# Patient Record
Sex: FEMALE | Race: WHITE | Employment: STUDENT | URBAN - METROPOLITAN AREA
[De-identification: names, ages, dates, MRNs, and addresses within clinical notes are randomized per-mention and may not be internally consistent; named-entity substitution may affect disease eponyms.]

---
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
## 2022-01-19 DIAGNOSIS — Z79.899 MEDICATION MANAGEMENT: Primary | ICD-10-CM

## 2022-01-19 RX ORDER — RANITIDINE HCL 75 MG
75 TABLET ORAL DAILY
COMMUNITY
End: 2022-10-12

## 2022-01-19 RX ORDER — ERENUMAB-AOOE 140 MG/ML
140 INJECTION, SOLUTION SUBCUTANEOUS
COMMUNITY
Start: 2021-10-04 | End: 2022-01-19 | Stop reason: SDUPTHER

## 2022-01-19 NOTE — Clinical Note
Katelyn Mena,    Completed pharmacy review for initial SMS visit. Patient is continuing on [address] for migraines. No recommendations for therapy at this time. Patient is recommended for covid booster and flu vaccination. Medication pended for review.     Thank you,  Esvin Clark, PharmD, [facility], [place]

## 2022-01-19 NOTE — PROGRESS NOTES
Specialty Medication Service    Patient's Name: Laxmi Westbrook YOB: 2000      Laxmi Westbrook is a 25 y.o. female presenting today for Specialty Medication Service visit. Patient is prescribed [facility] formulary medication, Aimovig. Medication list updated. Specialty Medication: Aimovig    Frequency: once monthly   Indication: Migraines  Initially Diagnosed: unknown    Additional Therapy:   · Sumatriptan  · Nurtec   · depakote   · Naproxen  Previous Therapy:   · amitriptyline   · topiramate     Specialist: [facility] of Neurology  Specialist Progress Note Available: No  Last Specialist Visit: 3/22/2021- Was with Gabe Ridley MD     Allergies   Allergen Reactions    Cefdinir Rash    Amoxicillin Rash        Past Medical History:   Diagnosis Date    Anxiety 3/22/2021    Rec[facility] 12/15/2020    Vitamin D deficiency       Social History     Tobacco Use    Smoking status: Never Smoker    Smokeless tobacco: Never Used   Substance Use Topics    Alcohol use: No     Family History   Adopted: Yes       REVIEW OF CURRENT DISEASE STATE  She has a well established history of recurrent migraines. Description of pain: aching pain on the frontal and unilateral. Associated symptoms: dizziness, light sensitivity and nausea. The migraines usually occur infrequent and typically last 1 hour. The headaches are brought on by nothing comes to mind, and are relieved by naproxen and heat pack. Current number of migraines in past month: 1 (not often) Her migraines have been getting better lately. Migraine treatment typically includes naproxen or warm pack. Usual recovery time: 1 hour      Lifestyle factors associated with migraines:   More than 2 drinks per day? No  Dehydration? No  High caffeine intake? No  High stress level? Yes  Irregular sleep patterns? No  Skipping meals? No  Too much screen time?  Yes    · Considering all the ways in which this illness and health conditions may affect you at this time, how are you doing (0 = very well, 10 = very poorly): 2  · How would you rate your pain on average? (0 = no pain, 10 = worst pain imaginable) 2  · During the past 4 weeks, have you missed any planned activities of daily living due to condition (work/school/other plans)? No  · During the past 4 weeks, have you had to seek urgent care, ER admission, Unplanned doctor office visit, or hospital admission? No    Contraindications to therapy present? No    MEDICATIONS  Current Outpatient Medications   Medication Sig Dispense Refill    divalproex (DEPAKOTE) 500 MG DR tablet Take 1 tablet by mouth 3 times daily 90 tablet 3    SUMAtriptan (IMITREX) 100 MG tablet Take 1 tablet by mouth once as needed for Migraine 9 tablet 1    desvenlafaxine succinate (PRISTIQ) 100 MG TB24 extended release tablet Take by mouth daily      VITAMIN D PO Take  by mouth. No current facility-administered medications for this visit. Current Specialty Medication:   Erenumab (Aimovig)  ADR Monitoring: Hypersensitivity reactions and Constipation including cases with serious complications resulting in hospitalization and surgery, has been reported. Constipation has generally occurred after the first dose; however, a later onset has also been observed. Concurrent use of medications that decrease GI motility may increase the risk for more severe constipation and the potential for constipation-related complications. Lab Monitoring: None recommended at this time. Instructed patient to keep tract of number of monthly migraine days to access efficacy  Storage: Refrigerate pre-filled syringe at 2°C to 8°C (36°F to 46°F). Do not freeze. Do not shake. Handling: Keep in original carton to protect from light until time of use. If removed from fridge, keep at room temperature in the original carton. (Must be used within 7 days).  Do not use beyond date on carton if refrigerated or beyond 7 days if out of refrigerator. Prior to SQ administration, remove from refrigerator and allow to sit at room temperature, outside of carton for at least 30 minutes (protect from direct sunlight) - do not warm by using other heat source (e.x hot water or microwave)  Patient reported side effects: None at this time  Specialty Medication Start Date: 06/2021  Appropriate Dose: Yes    Describe your medication adherence over the last 4 weeks: Very Good  How many doses have you missed in the last 4 weeks, if any? 0  How confident are you to follow the injection process and the treatment plan? (0-10) 9 Who injects? self  Does the patient have a current infection of any kind? No  Last refill of abortive medication: 11/2021      Drug Interactions:  No clinically significant interactions identified via [employer] Interaction Analysis as category D or higher.  _____________________________________________________________________  Renal Dosing:  Creatinine Clearance: CrCl cannot be calculated (No successful lab value found. ). No renal adjustments necessary. LABS  No results found for: BUN, CREATININE  No results found for: WBC, HGB, HCT, RBC, PLT  No results found for: ALKPHOS, ALT, AST, BILITOT, BILIDIR, IBILI    IMMUNIZATIONS  Immunization History   Administered Date(s) Administered    COVID-19, Pfizer Purple top, DILUTE for use, 12+ yrs, 30mcg/0.3mL dose 04/11/2021, 05/09/2021    DTaP (Infanrix) 01/09/2003, 02/10/2003, 04/14/2003, 10/27/2003, 05/11/2006    HIB PRP-T (ActHIB, Hiberix) 01/09/2003, 02/10/2003, 04/14/2003    Hepatitis A Ped/Adol (Havrix, Vaqta) 08/13/2010    Measles/Rubella 07/14/2003, 05/11/2006    Pneumococcal Vaccine 07/14/2003, 10/27/2003    Polio IPV (IPOL) 01/09/2003, 02/10/2003, 04/14/2003    Varicella (Varivax) 04/12/2003, 08/13/2010      Immunization status: up to date and documented. Needing Covid booster and flu vaccination       1.  Migraine Headache   Ernestinaell Tab is a 25 y.o. female being treated for Migraine.  Medication reconciliation completed, no drug-drug interactions identified. Allergy and diagnosis info reviewed and updated. Laxmi Westbrook has a history remarkable for the following conditions: insomnia, recurrent major depressive disorder, in partial remission, anxiety   · The patient has previously been treated with amitriptyline & topiramate (document treatment failure if applicable). Current therapy includes: Sumatriptan, Nurtec (PRN), depakote, Naproxen   Warnings, precautions, and contraindications reviewed with the patient. Also reviewed storage, administration, and proper disposal.   Current disease state symptoms include: None at this time   Medication Effectiveness: Patient disease is  well controlled on current therapy.  No side effects/adverse events reported, and no adherence issues identified.  Reviewed copay and advised patient that they will receive a copy of the patient rights and responsibility document with their welcome packet in the mail.  Patient is not considered high risk. Based on patient feedback/results of the assessment, the therapy is  still appropriate.  No medication-related problem(s) or patient need(s) identified that would require a care plan. Follow up in 6 months     2. Immunizations   Immunization status: up to date and documented. Missing Covid booster and flu vaccine     3. Drug Interactions   None at this time    PLAN  Goals of therapy, common side effects, medication storage, and administration reviewed with patient. continue Aimovig    Recommended monitoring to complete: None at this time   Recommended immunizations: Covid booster and flu vaccination   Non pharmacotherapy recommendations: Get enough sleep; Reduce stress; Drink plenty of water; Avoid triggers; Regular physical exercise  Keep all scheduled appointments. Return to clinic in 6 month(s). or call with any questions or concerns.      GÉNESIS Cullen [facility], PharmD, [address]  Ambulatory Clinical Pharmacist   2022 11:13 AM     Discussed with patient the Pharmacist Collaborative Practice Agreement. Patient provided verbal consent to participate in the collaborative practice agreement between the pharmacist and referred patient. This is in lieu of paper consent due to COVID-19 precautions and the use of remote/virtual visits. For [place] in place:   Yes   Recommendation Provided To: Provider: 2 via Note to Provider   Intervention Detail: Adherence Monitorin and Vaccine Recommended/Administered   Intervention Accepted By: Patient/Caregiver: 1   Time Spent (min): 60

## 2022-01-20 ENCOUNTER — OFFICE VISIT (OUTPATIENT)
Dept: PHARMACY | Age: 22
End: 2022-01-20

## 2022-01-20 DIAGNOSIS — G43.009 MIGRAINE WITHOUT AURA AND WITHOUT STATUS MIGRAINOSUS, NOT INTRACTABLE: Primary | ICD-10-CM

## 2022-01-20 DIAGNOSIS — G43.511 MIGRAINE AURA, PERSISTENT, INTRACTABLE, WITH STATUS MIGRAINOSUS: ICD-10-CM

## 2022-01-20 RX ORDER — ERENUMAB-AOOE 140 MG/ML
140 INJECTION, SOLUTION SUBCUTANEOUS
Qty: 1 PEN | Refills: 3 | Status: SHIPPED | OUTPATIENT
Start: 2022-01-20 | End: 2022-05-13 | Stop reason: SDUPTHER

## 2022-01-20 NOTE — PROGRESS NOTES
I called the patient on the phone to inform her that I was the new medical director for the subspecialty medical management program.    Patient is 25years old with a history of migraines. She sees a neurologist at [facility].  She has been taking Aimovig for the last several months. Prior to starting [facility] she was getting migraines once or twice a week. Since starting her Aimovig she gets it once or twice a month. Patient states that the medication is working very well for her. She has not noticed any major side effects. The patient has not had any trouble getting the medication. Overall she is very satisfied with the program.    I informed the patient that her primary care physician and urologist will still be primarily managing her migraines. I told her that my job was to facilitate her with the medication through the pharmacy program.  She verbalized understanding.       Julio Cesar Barnes MD 1/20/2022 3:13 PM

## 2022-01-28 PROBLEM — F33.1 MODERATE EPISODE OF RECURRENT MAJOR DEPRESSIVE DISORDER (HCC): Status: ACTIVE | Noted: 2018-12-10

## 2022-01-28 PROBLEM — F41.1 GAD (GENERALIZED ANXIETY DISORDER): Status: ACTIVE | Noted: 2018-12-10

## 2022-01-28 PROBLEM — G90.A POTS (POSTURAL ORTHOSTATIC TACHYCARDIA SYNDROME): Status: ACTIVE | Noted: 2018-06-08

## 2022-03-21 ENCOUNTER — TELEPHONE (OUTPATIENT)
Dept: INTERNAL MEDICINE CLINIC | Age: 22
End: 2022-03-21

## 2022-03-21 NOTE — TELEPHONE ENCOUNTER
----- Message from Ce Marin sent at 3/21/2022  9:12 AM EDT -----  Subject: Message to Provider    QUESTIONS  Information for Provider? Patient will need Hep A shot for traveling   abroad to [facility]. Is she able to get it at her 5/10 physical? Please   contact her to advise.  ---------------------------------------------------------------------------  --------------  CALL BACK INFO  What is the best way for the office to contact you? OK to leave message on   voicemail, OK to respond with electronic message via [employer] portal (only   for patients who have registered [employer] account)  Preferred Call Back Phone Number? [phone]  ---------------------------------------------------------------------------  --------------  SCRIPT ANSWERS  Relationship to Patient? Third Party  Representative Name?  [facility])

## 2022-05-13 DIAGNOSIS — G43.009 MIGRAINE WITHOUT AURA AND WITHOUT STATUS MIGRAINOSUS, NOT INTRACTABLE: Primary | ICD-10-CM

## 2022-05-13 DIAGNOSIS — G43.009 MIGRAINE WITHOUT AURA AND WITHOUT STATUS MIGRAINOSUS, NOT INTRACTABLE: ICD-10-CM

## 2022-05-13 RX ORDER — ERENUMAB-AOOE 140 MG/ML
140 INJECTION, SOLUTION SUBCUTANEOUS
Qty: 1 PEN | Refills: 3 | Status: CANCELLED | OUTPATIENT
Start: 2022-05-13

## 2022-05-13 RX ORDER — ERENUMAB-AOOE 140 MG/ML
140 INJECTION, SOLUTION SUBCUTANEOUS
Qty: 1 PEN | Refills: 3 | Status: SHIPPED | OUTPATIENT
Start: 2022-05-13

## 2022-05-13 NOTE — TELEPHONE ENCOUNTER
CLINICAL PHARMACY NOTE - SPECIALTY MEDICATION SERVICE     Prosper Diaz is a 23 y.o. female enrolled in the Specialty Medication Service. We received a refill request for Aimovig on 5/13/2022. Patient has a signed CPA on file dated 1/19/2022 . Patient last met with SMS on 1/20/2022 and is due for follow up on 6/29/2022. Follow up visit is not scheduled at this time. Patient's last visit with specialist (Neurology) was on 5/12/2022 and patient is due for follow up visit in 2-3 months per office visit documentation. Follow up visit is not scheduled at this time. Notes can be found in Care Everywhere    Original Rx was written for 1+3 fills on 5/12/2022.      Thank you,    Veverly Roxanna    Requested Prescriptions     Pending Prescriptions Disp Refills    Erenumab-aooe (AIMOVIG) 140 MG/ML SOAJ 1 pen 3     Sig: Inject 140 mg into the skin every 30 days

## 2022-05-13 NOTE — TELEPHONE ENCOUNTER
Refill has been sent to [facility] per CPA. No further outreach needed at this time. Vivienne Barrett [facility], PharmD, [address]  Ambulatory Clinical Pharmacist   5/13/2022 9:48 AM    For Pharmacy Admin Tracking Only     CPA in place:   Yes   Time Spent (min): 5

## 2022-08-04 ENCOUNTER — TELEPHONE (OUTPATIENT)
Dept: PHARMACY | Age: 22
End: 2022-08-04

## 2022-08-04 NOTE — TELEPHONE ENCOUNTER
Medication Management Service    Date: 8/4/2022  Patient's Name: Mala Brock YOB: 2000            _____________________________________________________________________________________________    Patient on SMS medication, Aimovig. Due for SMS pharmD visit for specialty medication services. Last SMS visit 1/20/2022    Left voicemail #2. Will attempt to contact again at a further date.     John Sifuentes, Rogerio  Ambulatory Clinical Pharmacist   Specialty Medication Services   Phone: [phone]  8/4/2022 11:21 AM    For Pharmacy Admin Tracking Only    CPA in place:  Yes  Recommendation Provided To: Patient/Caregiver: 1 via Telephone  Intervention Detail: Scheduled Appointment  Gap Closed?: No   Intervention Accepted By: Patient/Caregiver: 0  Time Spent (min): 5

## 2022-08-04 NOTE — LETTER
[facility]  [address], [address]          [place]   [address] [address] [place] [address]           08/18/22     Dear [place],    We tried to contact you recently to schedule your follow up appointment with the Specialty Medication Service but were unable to reach you on the telephone. As a reminder, patients receive one-on-one education and medication counseling from a pharmacist at no cost to the patient as part of the Specialty Medication Service. Please give us a call at [phone] option 4 to schedule your follow up appointment at your earliest convenience.      Thank you,    Specialty Medication Service  Telephone: [phone] option 4   Fax: [phone]  Email: [email or web address]

## 2022-08-18 NOTE — TELEPHONE ENCOUNTER
Medication Management Service    Date: 8/18/2022  Patient's Name: Shahid Awad YOB: 2000            _____________________________________________________________________________________________    Patient on SMS medication, Aimovig. Due for SMS pharmD visit for specialty medication services. Last SMS visit 1/20/2022    Sent [employer] message and letter. Will attempt to contact again in one month.      Nabor MartinD  Ambulatory Clinical Pharmacist   Specialty Medication Services   Phone: [phone]  8/18/2022 10:02 AM    For Pharmacy Admin Tracking Only    CPA in place:  Yes  Recommendation Provided To: Patient/Caregiver: 1 via Telephone  Intervention Detail: Scheduled Appointment  Gap Closed?: No   Intervention Accepted By: Patient/Caregiver: 0  Time Spent (min): 5

## 2022-09-13 ENCOUNTER — TELEPHONE (OUTPATIENT)
Dept: PHARMACY | Age: 22
End: 2022-09-13

## 2022-09-13 NOTE — TELEPHONE ENCOUNTER
Specialty Medication Service    Date: 9/13/2022  Patient's Name: eLlo Paredes YOB: 2000            _____________________________________________________________________________________________    Left message to schedule PharmD follow up appointment for Specialty Medication Services. Please call: [phone] option 4. Will continue to outreach as appropriate.     Madelin Pozo PharmD  Ambulatory Clinical Pharmacist   Specialty Medication Services   Phone: [phone]  9/13/2022 9:26 AM

## 2022-09-16 NOTE — TELEPHONE ENCOUNTER
Specialty Medication Service    Date: 9/16/2022  Patient's Name: Muna Webster YOB: 2000            _____________________________________________________________________________________________    Left message to schedule PharmD follow up appointment for Specialty Medication Services. Please call: [phone] option 4. Will continue to outreach as appropriate.     Blaire Dumas, PharmD  Ambulatory Clinical Pharmacist   Specialty Medication Services   Phone: [phone]  9/16/2022 9:22 AM

## 2022-09-19 NOTE — TELEPHONE ENCOUNTER
Specialty Medication Service    Date: 9/19/2022  Patient's Name: Red Old YOB: 2000            _____________________________________________________________________________________________    Left message to schedule PharmD follow up appointment for Specialty Medication Services. Please call: [phone] option 4. Attempted 3 times, will attempt outreach again in one month.      Meghann Bunn PharmD  Ambulatory Clinical Pharmacist   Specialty Medication Services   Phone: [phone]  9/19/2022 2:32 PM     For Pharmacy Admin Tracking Only    CPA in place:  Yes  Recommendation Provided To: Patient/Caregiver: 1 via Telephone  Intervention Detail: Scheduled Appointment  Gap Closed?: No   Intervention Accepted By: Patient/Caregiver: 0  Time Spent (min): 15

## 2022-10-04 ENCOUNTER — TELEPHONE (OUTPATIENT)
Dept: PHARMACY | Age: 22
End: 2022-10-04

## 2022-10-04 NOTE — TELEPHONE ENCOUNTER
Specialty Medication Service    Date: 10/4/2022  Patient's Name: Daphne Rich YOB: 2000            _____________________________________________________________________________________________    SMS f/u for Aimovig scheduled for 10/12/2022 with Mey. [facility]      Romel Mueller CPhT  Clinical    Specialty Medication Service   [phone] option [address] in place:  No  Recommendation Provided To: Patient/Caregiver: 1 via Telephone  Intervention Detail: Scheduled Appointment  Gap Closed?: Yes   Intervention Accepted By: Patient/Caregiver: 1  Time Spent (min): 5

## 2022-10-11 RX ORDER — NAPROXEN SODIUM 550 MG/1
TABLET ORAL
COMMUNITY
Start: 2022-05-12

## 2022-10-11 RX ORDER — ERGOCALCIFEROL 1.25 MG/1
CAPSULE ORAL
COMMUNITY
End: 2022-10-12

## 2022-10-11 NOTE — PROGRESS NOTES
Specialty Medication Service    Patient's Name: Le Cintron YOB: 2000      Reason for visit: Le Cintron is a 23 y.o. female presenting today for Specialty Medication Service visit follow up. Patient last seen by [facility] 1/20/22. Patient continues on SMS formulary medication, Aimovig. Pharmacy completed Specialty Medication Service visit for medication monitoring and counseling. Medication list updated. Specialty Medication: Aimovig 140mg/mL  SOAJ  Frequency: Every 30 days   Indication: Intractable migraine without aura and without status migrainosus  Initially Diagnosed: 2014, severe migraine in 2021   Additional Therapy:   Naproxen  Sumatriptan  Previous Therapy:   Amitriptyline   Topiramate   Divalproex  Nurtec    Specialist:   Enrique Willingham, ROBERTO-CNP  [address] [facility] Division of Neurology  [address] [place] [address]  P: [phone]  F: [phone]  Specialist Progress Note Available: Yes, in [address] [facility] [address] Specialist Visit: 5/12/22  Continue naproxen 550mg PRN up to 3 times per week, sumatriptan 100mg PRN up to 6 times per month, and Aimovig monthly for prevention of headaches. Start vitamin D 2000 units daily. Follow up in 2-3 months. Allergies   Allergen Reactions    Cefdinir Rash    Amoxicillin Rash       Past Medical History:   Diagnosis Date    Anxiety 3/22/2021    Recurrent major depressive disorder, in partial remission ([facility] [address] 12/15/2020    Vitamin D deficiency       Social History     Tobacco Use    Smoking status: Never    Smokeless tobacco: Never   Substance Use Topics    Alcohol use: No     Family History   Adopted: Yes       INTERM HISTORY  Have you been diagnosed with any additional conditions since we last talked? no  Have you developed any new allergies since we last talked? no  Have you stopped taking any medications or supplements since we last talked?  yes, desvenlafaxine   Have you started taking any additional medications or supplements since we last talked? yes, vitamin D    REVIEW OF CURRENT DISEASE STATE  Migraine Headaches: She has a well established history of recurrent migraines. Patient states that the Garima Rivera is doing well. She missed one month due to studying abroad and she felt more at risk for migraines than she does taking the medication. Has had no adherence issues with medication. Describes migraines as a 3/10 pain (ranges from a 2-7/10) and typically located at the temples. Her migraines are usually triggered by dehydration or weather changes. Associated symptoms include nausea, photophobia, phonophobia, and vision changes. Her PRN medications (naproxen and sumatriptan) as well as sleeping offer relief from the migraine. Current number of migraines in past month: 1 per month  Usual duration of migraine: 2 hours  Usual recovery time: a few hours      Lifestyle factors associated with migraines:   More than 2 drinks per day? No  Dehydration? No  High caffeine intake? Yes- cup of coffee per day   High stress level? No pretty medium stress level  Irregular sleep patterns? No  Skipping meals? No  Too much screen time? Yes    · Considering all the ways in which illness and health conditions may affect you at this time, please indicate below how you are doing: (0 = very well, 10 = very poorly)? 1  · How would you rate your pain on average? (0 = no pain, 10 = worst pain imaginable) 1  · During the past 4 weeks, have you missed any activities of daily living (work/school)? No  · During the past 4 weeks, have you had to seek urgent care? No    MEDICATIONS  Current Outpatient Medications   Medication Sig Dispense Refill    naproxen sodium (ANAPROX) 550 MG tablet Take 550 MG  along with sports drink at the onset of headache. May repeat in 3-4 hours, but no more than 3 days per week.       Erenumab-aooe (AIMOVIG) 140 MG/ML SOAJ Inject 140 mg into the skin every 30 days 1 pen 3    SUMAtriptan (IMITREX) 100 MG tablet Take 1 tablet by mouth once as needed for Migraine 9 tablet 1    VITAMIN D PO Take  by mouth. No current facility-administered medications for this visit. Current Specialty Medication:   Erenumab (Aimovig)  Indication Specific Recommended Dose:   Migraine prevention: 70 to 140 mg once monthly  Contraindications: known serious hypersensitivity to erenumab  Warnings/Precautions: Hypersensitivity reactions and Constipation including cases with serious complications resulting in hospitalization and surgery, has been reported. Constipation has generally occurred after the first dose; however, a later onset has also been observed. Concurrent use of medications that decrease GI motility may increase the risk for more severe constipation and the potential for constipation-related complications. Recommended Monitoring: None recommended at this time. Instructed patient to keep tract of number of monthly migraine days to access efficacy  Storage: Refrigerate pre-filled syringe at 2°C to 8°C (36°F to 46°F). Do not freeze. Do not shake. Handling: Keep in original carton to protect from light until time of use. If removed from fridge, keep at room temperature in the original carton. (Must be used within 7 days). Do not use beyond date on carton if refrigerated or beyond 7 days if out of refrigerator. Prior to SQ administration, remove from refrigerator and allow to sit at room temperature, outside of carton for at least 30 minutes (protect from direct sunlight) - do not warm by using other heat source (e.x hot water or microwave)  Patient Reported Side Effects: none  Specialty Medication Start Date: 6/2021  Appropriate Dose: Yes    Describe your medication adherence over the last 4 weeks: Very Good  How many doses have you missed in the last 4 weeks, if any? 0  How confident are you to follow the injection process and the treatment plan? (0-10) 9 Who injects?  Self or mom   Does the patient have a current infection of any kind? No  Last refill of abortive medication: 5/2022  Number of refills on abortive medication in last 3 months: 1    Contraindications to therapy present? No    Drug Interactions:  No clinically significant interactions identified via [place] Interaction Analysis as category D or higher.  _____________________________________________________________________  Renal Dosing:  Creatinine Clearance: CrCl cannot be calculated (No successful lab value found. ). Unknown renal function. LABS  No results found for: BUN, CREATININE  No results found for: WBC, HGB, HCT, RBC, PLT  No results found for: ALKPHOS, ALT, AST, BILITOT, BILIDIR, IBILI    IMMUNIZATIONS  Immunization History   Administered Date(s) Administered    COVID-19, PFIZER PURPLE top, DILUTE for use, (age 15 y+), 30mcg/0.3mL 04/11/2021, 05/09/2021    DTaP (Infanrix) 01/09/2003, 02/10/2003, 04/14/2003, 10/27/2003, 05/11/2006    DTaP vaccine 05/11/2006    HIB PRP-T (ActHIB, Hiberix) 01/09/2003, 02/10/2003, 04/14/2003    HPV 9-valent Reita Decent) 04/06/2018, 06/06/2018, 05/15/2019    Hepatitis A Ped/Adol (Havrix, Vaqta) 08/13/2010, 04/09/2013    Influenza Virus Vaccine 11/12/2020    Influenza, FLUARIX, FLULAVAL, FLUZONE (age 10 mo+) AND AFLURIA, (age 1 y+), PF, 0.5mL 11/12/2020    MMR 07/14/2003, 05/11/2006    Measles/Rubella 07/14/2003, 05/11/2006    Meningococcal B, Recombinant Faheem Sicilian) 06/06/2018, 05/15/2019    Meningococcal MCV4O (Menveo) 06/06/2018    Meningococcal MCV4P (Menactra) 09/05/2013    Pneumococcal Vaccine 07/14/2003, 10/27/2003    Polio IPV (IPOL) 01/09/2003, 02/10/2003, 04/14/2003, 05/11/2006    Tdap (Boostrix, Adacel) 09/05/2013    Varicella (Varivax) 04/12/2003, 08/13/2010      Immunization status: up to date and documented. ASSESSMENTS  Fall Risk 10/12/2022   2 or more falls in past year? no   Fall with injury in past year?  no     PROMIS V1.1 Global Health 10/12/2022   In general, would you say your health is: 3   In general, would you say your quality of life is: 4   In general, how would you rate your physical health? 4   In general, how would you rate your mental health, including your mood and your ability to think? 3   In general, how would you rate your satisfaction with your social activities and relationships? 4   In general, please rate how well you carry out your usual social activities and roles. (This includes activities at home, at work and in your community, and responsibilities as a parent, child, spouse, employee, friend, etc.) 4   To what extent are you able to carry out your everyday physical activities such as walking, climbing stairs, carrying groceries, or moving a chair? 5   In the past 7 days how often have you been bothered by emotional problems such as feeling anxious, depressed or irritable? 4   In the past 7 days how would you rate your fatigue on average? 3   In the past 7 days how would you rate your pain on average? 1   PROMIS Physical Score 13   PROMIS Mental Score 15       Migraine Headache  Daphne Rich is a 23 y.o. female being treated for Migraines. Medication Reconciliation completed (reviewed medication list with patient), no drug-drug interactions identified. Allergy and diagnosis info reviewed and updated. Daphne Rich has a history remarkable for the following conditions: depression and migraines. Current therapy includes: Aimovig 140mg monthly + naproxen 550mg PRN + sumatriptan 100mg PRN  Medication Effectiveness: Patient disease is  well controlled on current therapy. Patient had no questions regarding the medication's warnings, precautions, and contraindications. Confirmed appropriate storage and disposal.  Patient had no concerns with the administration process. Current disease state symptoms include: typically has ~1 migraine per month. Usually triggered by weather changes or dehydration  No side effects/adverse events reported, and no adherence issues identified.   Functional and cognitive limitations include: None  Patient is not considered high risk. Based on patient feedback/results of the assessment, the therapy is  still appropriate. No medication-related problem(s) or patient need(s) identified that would require a care plan. Follow up in 365 days     Immunizations  Immunization status: up to date and documented. Drug Interactions  No clinically significant interactions identified via [employer] Interaction Analysis as category D or higher. Other Identified Potential Issues  Discussed with patient the Pharmacist Collaborative Practice Agreement. Patient provided verbal and/or electronic (ex. [employer]) consent to participate in the collaborative practice agreement between the pharmacist and referred patient. This is in lieu of paper consent due to COVID-19 precautions and the use of remote/virtual visits. PLAN  Goals of therapy, common side effects, medication storage, and administration reviewed with patient. continue Aimovig 140mg every 30 days as prescribed    Recommended monitoring to complete: none at this time  Recommended immunizations: none at this time  Non pharmacotherapy recommendations: Get enough sleep; Reduce stress; Drink plenty of water; Avoid triggers; Regular physical exercise  Keep all scheduled appointments. Melia Cahse PharmD  Ambulatory Clinical Pharmacist   Specialty Medication Services   Phone: [phone]  10/12/2022 9:09 AM    For Pharmacy Admin Tracking Only    CPA in place:  Yes  Recommendation Provided To: Patient/Caregiver: 1 via Telephone  Intervention Detail: Adherence Monitorin and Scheduled Appointment  Gap Closed?: Yes   Intervention Accepted By: Patient/Caregiver: 1  Time Spent (min): 60    Krishn Officer was evaluated through a synchronous (real-time) audio encounter. Patient identification was verified at the start of the visit.  She (or guardian if applicable) is aware that this is a billable service, which includes applicable co-pays. This visit was conducted with the patient's (and/or legal guardian's) verbal consent. She has not had a related appointment within my department in the past 7 days or scheduled within the next 24 hours. The patient was located at Home: [address] [address]. The provider was located at [facility] (Appt Dept): [address], [address] [address],[address]  [place],  [facility].

## 2022-10-12 ENCOUNTER — PHARMACY VISIT (OUTPATIENT)
Dept: PHARMACY | Age: 22
End: 2022-10-12

## 2022-10-12 DIAGNOSIS — G43.009 MIGRAINE WITHOUT AURA AND WITHOUT STATUS MIGRAINOSUS, NOT INTRACTABLE: Primary | ICD-10-CM

## 2022-10-12 ASSESSMENT — PROMIS GLOBAL HEALTH SCALE
IN GENERAL, WOULD YOU SAY YOUR QUALITY OF LIFE IS...[ON A SCALE OF 1 (POOR) TO 5 (EXCELLENT)]: 4
IN GENERAL, HOW WOULD YOU RATE YOUR PHYSICAL HEALTH [ON A SCALE OF 1 (POOR) TO 5 (EXCELLENT)]?: 4
IN GENERAL, WOULD YOU SAY YOUR HEALTH IS...[ON A SCALE OF 1 (POOR) TO 5 (EXCELLENT)]: 3
IN THE PAST 7 DAYS, HOW WOULD YOU RATE YOUR FATIGUE ON AVERAGE [ON A SCALE FROM 1 (NONE) TO 5 (VERY SEVERE)]?: 3
TO WHAT EXTENT ARE YOU ABLE TO CARRY OUT YOUR EVERYDAY PHYSICAL ACTIVITIES SUCH AS WALKING, CLIMBING STAIRS, CARRYING GROCERIES, OR MOVING A CHAIR [ON A SCALE OF 1 (NOT AT ALL) TO 5 (COMPLETELY)]?: 5
IN GENERAL, HOW WOULD YOU RATE YOUR MENTAL HEALTH, INCLUDING YOUR MOOD AND YOUR ABILITY TO THINK [ON A SCALE OF 1 (POOR) TO 5 (EXCELLENT)]?: 3
IN THE PAST 7 DAYS, HOW OFTEN HAVE YOU BEEN BOTHERED BY EMOTIONAL PROBLEMS, SUCH AS FEELING ANXIOUS, DEPRESSED, OR IRRITABLE [ON A SCALE FROM 1 (NEVER) TO 5 (ALWAYS)]?: 4
SUM OF RESPONSES TO QUESTIONS 3, 6, 7, & 8: 13
IN THE PAST 7 DAYS, HOW WOULD YOU RATE YOUR PAIN ON AVERAGE [ON A SCALE FROM 0 (NO PAIN) TO 10 (WORST IMAGINABLE PAIN)]?: 1
IN GENERAL, HOW WOULD YOU RATE YOUR SATISFACTION WITH YOUR SOCIAL ACTIVITIES AND RELATIONSHIPS [ON A SCALE OF 1 (POOR) TO 5 (EXCELLENT)]?: 4
IN GENERAL, PLEASE RATE HOW WELL YOU CARRY OUT YOUR USUAL SOCIAL ACTIVITIES (INCLUDES ACTIVITIES AT HOME, AT WORK, AND IN YOUR COMMUNITY, AND RESPONSIBILITIES AS A PARENT, CHILD, SPOUSE, EMPLOYEE, FRIEND, ETC) [ON A SCALE OF 1 (POOR) TO 5 (EXCELLENT)]?: 4
SUM OF RESPONSES TO QUESTIONS 2, 4, 5, & 10: 15

## 2022-10-12 NOTE — Clinical Note
Katelyn Wolf. Completed pharmacy review for SMS visit. Patient last seen by you in Jan 2022. Patient is continuing on [address] for migraines. No recommendations for therapy at this time. Patient is recommended for no vaccination.    Thank you, Evan Hernandez, PharmD Ambulatory Clinical Pharmacist  Specialty Medication Services  Phone: [phone] 10/12/2022 9:12 AM

## 2022-12-30 DIAGNOSIS — G43.009 MIGRAINE WITHOUT AURA AND WITHOUT STATUS MIGRAINOSUS, NOT INTRACTABLE: ICD-10-CM

## 2022-12-30 RX ORDER — ERENUMAB-AOOE 140 MG/ML
140 INJECTION, SOLUTION SUBCUTANEOUS
Qty: 1 ADJUSTABLE DOSE PRE-FILLED PEN SYRINGE | Refills: 3 | Status: SHIPPED | OUTPATIENT
Start: 2022-12-30

## 2022-12-30 NOTE — TELEPHONE ENCOUNTER
Specialty Medication Service    Date: 12/30/2022  Patient's Name: Lalit Luis YOB: 2000            _____________________________________________________________________________________________    Lalit Luis is a 23 y.o. female enrolled in the Specialty Medication Service. We received a refill request for Aimovig on 12/30/22. Original Rx was written for 1+3 fills on 12/29/22.      Thank you,    Andrea Johnson      Requested Prescriptions     Pending Prescriptions Disp Refills    Erenumab-aooe (AIMOVIG) 140 MG/ML SOAJ 1 Adjustable Dose Pre-filled Pen Syringe 3     Sig: Inject 140 mg into the skin every 30 days

## 2023-03-22 DIAGNOSIS — G43.009 MIGRAINE WITHOUT AURA AND WITHOUT STATUS MIGRAINOSUS, NOT INTRACTABLE: ICD-10-CM

## 2023-03-22 RX ORDER — ERENUMAB-AOOE 140 MG/ML
140 INJECTION, SOLUTION SUBCUTANEOUS
Qty: 3 ADJUSTABLE DOSE PRE-FILLED PEN SYRINGE | Refills: 0 | Status: SHIPPED | OUTPATIENT
Start: 2023-03-22

## 2023-03-22 NOTE — TELEPHONE ENCOUNTER
CLINICAL PHARMACY NOTE - SPECIALTY MEDICATION SERVICE     We received a refill request for Aimovig on 3/22/2023. Patient does have a CPA on file. Patient last met with SMS on 10/12/2022 and is due for follow up in April. Follow up visit is not scheduled at this time. Patient's last visit with specialist (Neurology - Dr. Hernandez Mcelroy) was on 3/20/2023. Original Rx was written for #1 pen + 2 refills on 3/20/2023. Will order refill at this time.      Abdulaziz Montalvo PharmD  Ambulatory Clinical Pharmacist   Specialty Medication Services   Phone: [phone] option 4  3/22/2023 5:48 PM     For Pharmacy Admin Tracking Only    Program: [facility]  CPA in place:  Yes  Recommendation Provided To: Patient/Caregiver: 1 via Telephone  Intervention Detail: Refill(s) Provided  Intervention Accepted By: Patient/Caregiver: 1   Time Spent (min): 15

## 2023-03-22 NOTE — TELEPHONE ENCOUNTER
Specialty Medication Service    Date: 3/22/2023  Patient's Name: Efrain Denise YOB: 2000            _____________________________________________________________________________________________    Efrain Denise is a 23 y.o. female enrolled in the Specialty Medication Service. We received a refill request for Aimovig on 3/22/23. Original Rx was written for 3 fills on 3/20/2023.      Thank you,    Balbir Muhammad      Requested Prescriptions     Pending Prescriptions Disp Refills    Erenumab-aooe (AIMOVIG) 140 MG/ML SOAJ 3 Adjustable Dose Pre-filled Pen Syringe 0     Sig: Inject 140 mg into the skin every 30 days

## 2023-10-03 ENCOUNTER — TELEPHONE (OUTPATIENT)
Dept: PHARMACY | Age: 23
End: 2023-10-03

## 2023-10-03 NOTE — TELEPHONE ENCOUNTER
Specialty Medication Service    Date: 10/3/2023  Patient's Name: Nirmala Boyer YOB: 2000            _____________________________________________________________________________________________    Anamaria Serna to leave message to schedule PharmD follow up and Medical Director appointment for Specialty Medication Services. Sent text.     Bradley Hill CPhT  Pharmacy   Specialty Medication Services   Phone: [phone] option 4

## 2023-10-05 NOTE — TELEPHONE ENCOUNTER
Specialty Medication Service    Date: 10/5/2023  Patient's Name: Robert Garnica YOB: 2000            _____________________________________________________________________________________________    Left message to schedule PharmD follow up appointment for Specialty Medication Services. Please call: [phone] option 4. Will continue to outreach as appropriate.     Froilan Barksdale CPhT  Pharmacy   Specialty Medication Services   Phone: [phone] option 4

## 2023-10-09 NOTE — TELEPHONE ENCOUNTER
Specialty Medication Service    Date: 10/9/2023  Patient's Name: Sari Foley YOB: 2000            _____________________________________________________________________________________________    Patient no longer is taking SMS formulary medication (medication discontinued Aimovig). Patient is no longer enrolled in SMS program. Reached patient to explain they will no longer be eligible for SMS services and that we will be discharging them from the program.  Informed patient future SMS appointments will be canceled and no further outreach planned.     Aram Bundy, PharmD [facility]  Ambulatory Clinical Pharmacist  Specialty Medication Services  Phone: [phone]  Fax: [phone]    For Pharmacy Admin Tracking Only    Program: SMS  CPA in place:  No  Time Spent (min): 30

## 2024-07-01 ENCOUNTER — OFFICE VISIT (OUTPATIENT)
Dept: INTERNAL MEDICINE CLINIC | Age: 24
End: 2024-07-01
Payer: COMMERCIAL

## 2024-07-01 VITALS
OXYGEN SATURATION: 100 % | TEMPERATURE: 97.3 F | WEIGHT: 121 LBS | SYSTOLIC BLOOD PRESSURE: 94 MMHG | DIASTOLIC BLOOD PRESSURE: 62 MMHG | HEART RATE: 75 BPM | HEIGHT: 63 IN | BODY MASS INDEX: 21.44 KG/M2

## 2024-07-01 DIAGNOSIS — G43.009 MIGRAINE WITHOUT AURA AND WITHOUT STATUS MIGRAINOSUS, NOT INTRACTABLE: ICD-10-CM

## 2024-07-01 DIAGNOSIS — E55.9 VITAMIN D DEFICIENCY: ICD-10-CM

## 2024-07-01 DIAGNOSIS — R55 SYNCOPE AND COLLAPSE: ICD-10-CM

## 2024-07-01 DIAGNOSIS — G90.A POTS (POSTURAL ORTHOSTATIC TACHYCARDIA SYNDROME): Primary | ICD-10-CM

## 2024-07-01 DIAGNOSIS — G90.A POTS (POSTURAL ORTHOSTATIC TACHYCARDIA SYNDROME): ICD-10-CM

## 2024-07-01 LAB
BASOPHILS # BLD: 0.1 K/UL (ref 0–0.2)
BASOPHILS NFR BLD: 0.9 %
DEPRECATED RDW RBC AUTO: 13.3 % (ref 12.4–15.4)
EOSINOPHIL # BLD: 0.1 K/UL (ref 0–0.6)
EOSINOPHIL NFR BLD: 1.6 %
HCT VFR BLD AUTO: 37.7 % (ref 36–48)
HGB BLD-MCNC: 12.8 G/DL (ref 12–16)
LYMPHOCYTES # BLD: 2.1 K/UL (ref 1–5.1)
LYMPHOCYTES NFR BLD: 34.6 %
MCH RBC QN AUTO: 30.1 PG (ref 26–34)
MCHC RBC AUTO-ENTMCNC: 34.1 G/DL (ref 31–36)
MCV RBC AUTO: 88.4 FL (ref 80–100)
MONOCYTES # BLD: 0.5 K/UL (ref 0–1.3)
MONOCYTES NFR BLD: 8 %
NEUTROPHILS # BLD: 3.3 K/UL (ref 1.7–7.7)
NEUTROPHILS NFR BLD: 54.9 %
PLATELET # BLD AUTO: 247 K/UL (ref 135–450)
PMV BLD AUTO: 9.2 FL (ref 5–10.5)
RBC # BLD AUTO: 4.26 M/UL (ref 4–5.2)
WBC # BLD AUTO: 6.1 K/UL (ref 4–11)

## 2024-07-01 PROCEDURE — 99204 OFFICE O/P NEW MOD 45 MIN: CPT | Performed by: STUDENT IN AN ORGANIZED HEALTH CARE EDUCATION/TRAINING PROGRAM

## 2024-07-01 SDOH — ECONOMIC STABILITY: INCOME INSECURITY: HOW HARD IS IT FOR YOU TO PAY FOR THE VERY BASICS LIKE FOOD, HOUSING, MEDICAL CARE, AND HEATING?: NOT HARD AT ALL

## 2024-07-01 SDOH — ECONOMIC STABILITY: FOOD INSECURITY: WITHIN THE PAST 12 MONTHS, THE FOOD YOU BOUGHT JUST DIDN'T LAST AND YOU DIDN'T HAVE MONEY TO GET MORE.: NEVER TRUE

## 2024-07-01 SDOH — ECONOMIC STABILITY: FOOD INSECURITY: WITHIN THE PAST 12 MONTHS, YOU WORRIED THAT YOUR FOOD WOULD RUN OUT BEFORE YOU GOT MONEY TO BUY MORE.: NEVER TRUE

## 2024-07-01 SDOH — ECONOMIC STABILITY: HOUSING INSECURITY
IN THE LAST 12 MONTHS, WAS THERE A TIME WHEN YOU DID NOT HAVE A STEADY PLACE TO SLEEP OR SLEPT IN A SHELTER (INCLUDING NOW)?: NO

## 2024-07-01 ASSESSMENT — PATIENT HEALTH QUESTIONNAIRE - PHQ9
SUM OF ALL RESPONSES TO PHQ QUESTIONS 1-9: 0
2. FEELING DOWN, DEPRESSED OR HOPELESS: NOT AT ALL
SUM OF ALL RESPONSES TO PHQ QUESTIONS 1-9: 0
4. FEELING TIRED OR HAVING LITTLE ENERGY: NOT AT ALL
10. IF YOU CHECKED OFF ANY PROBLEMS, HOW DIFFICULT HAVE THESE PROBLEMS MADE IT FOR YOU TO DO YOUR WORK, TAKE CARE OF THINGS AT HOME, OR GET ALONG WITH OTHER PEOPLE: NOT DIFFICULT AT ALL
3. TROUBLE FALLING OR STAYING ASLEEP: NOT AT ALL
8. MOVING OR SPEAKING SO SLOWLY THAT OTHER PEOPLE COULD HAVE NOTICED. OR THE OPPOSITE, BEING SO FIGETY OR RESTLESS THAT YOU HAVE BEEN MOVING AROUND A LOT MORE THAN USUAL: NOT AT ALL
6. FEELING BAD ABOUT YOURSELF - OR THAT YOU ARE A FAILURE OR HAVE LET YOURSELF OR YOUR FAMILY DOWN: NOT AT ALL
SUM OF ALL RESPONSES TO PHQ QUESTIONS 1-9: 0
7. TROUBLE CONCENTRATING ON THINGS, SUCH AS READING THE NEWSPAPER OR WATCHING TELEVISION: NOT AT ALL
5. POOR APPETITE OR OVEREATING: NOT AT ALL
9. THOUGHTS THAT YOU WOULD BE BETTER OFF DEAD, OR OF HURTING YOURSELF: NOT AT ALL
SUM OF ALL RESPONSES TO PHQ QUESTIONS 1-9: 0

## 2024-07-01 NOTE — PROGRESS NOTES
Joan Odonnell (:  2000) is a 24 y.o. female here for evaluation of the following chief complaint(s):  Established New Doctor (Annual exam, fainted last weekend 15min. B/p 70's/Last visit  DR LOBO 3/2021 VV)      Assessment & Plan   ASSESSMENT/PLAN:  1. POTS (postural orthostatic tachycardia syndrome)  Assessment & Plan:  Dx 6 years ago after episodes of syncope.  Another episode over the weekend, which seem to be more vasovagal related.  Discussed with patient to stay well-hydrated, will need to increase electrolytes.  Check labs  Orders:  -     Hemoglobin A1C; Future  -     Lipid, Fasting; Future  -     TSH with Reflex; Future  -     Comprehensive Metabolic Panel; Future  -     CBC with Auto Differential; Future  -     Vitamin D 25 Hydroxy; Future  -     Vitamin B12; Future  2. Migraine without aura and without status migrainosus, not intractable  Assessment & Plan:  See neurology.  She is on Imitrex as needed and naproxen as needed  Orders:  -     Hemoglobin A1C; Future  -     Lipid, Fasting; Future  -     TSH with Reflex; Future  -     Comprehensive Metabolic Panel; Future  -     CBC with Auto Differential; Future  3. Vitamin D deficiency  Assessment & Plan:  Check vitamin D level.  No longer on vitamin D supplement  Orders:  -     Vitamin D 25 Hydroxy; Future  4. Syncope and collapse  -     Vitamin D 25 Hydroxy; Future  -     Vitamin B12; Future      Return in about 1 year (around 2025).         Subjective   SUBJECTIVE/OBJECTIVE:  HPI  She is a 24-year-old female presented today with her mother to establish care.  Overall she is doing well.  She does have a history of POTS.  She was diagnosed with this after a couple episode of syncope about 60 years ago.  Doing well after that, however have another episode of syncope over the weekend.  Passed out over the weekend at the [employer] festival.   She felt ligthheaded and passed out.  Her mother reports that she passed out for about 10 minutes also, wake up

## 2024-07-01 NOTE — ASSESSMENT & PLAN NOTE
Dx 6 years ago after episodes of syncope.  Another episode over the weekend, which seem to be more vasovagal related.  Discussed with patient to stay well-hydrated, will need to increase electrolytes

## 2024-07-02 LAB
25(OH)D3 SERPL-MCNC: 19.9 NG/ML
ALBUMIN SERPL-MCNC: 4.7 G/DL (ref 3.4–5)
ALBUMIN/GLOB SERPL: 2 {RATIO} (ref 1.1–2.2)
ALP SERPL-CCNC: 55 U/L (ref 40–129)
ALT SERPL-CCNC: 7 U/L (ref 10–40)
ANION GAP SERPL CALCULATED.3IONS-SCNC: 12 MMOL/L (ref 3–16)
AST SERPL-CCNC: 14 U/L (ref 15–37)
BILIRUB SERPL-MCNC: 0.7 MG/DL (ref 0–1)
BUN SERPL-MCNC: 7 MG/DL (ref 7–20)
CALCIUM SERPL-MCNC: 9.6 MG/DL (ref 8.3–10.6)
CHLORIDE SERPL-SCNC: 102 MMOL/L (ref 99–110)
CO2 SERPL-SCNC: 24 MMOL/L (ref 21–32)
CREAT SERPL-MCNC: 0.6 MG/DL (ref 0.6–1.1)
EST. AVERAGE GLUCOSE BLD GHB EST-MCNC: 85.3 MG/DL
GFR SERPLBLD CREATININE-BSD FMLA CKD-EPI: >90 ML/MIN/{1.73_M2}
GLUCOSE SERPL-MCNC: 75 MG/DL (ref 70–99)
HBA1C MFR BLD: 4.6 %
POTASSIUM SERPL-SCNC: 4.1 MMOL/L (ref 3.5–5.1)
PROT SERPL-MCNC: 7.1 G/DL (ref 6.4–8.2)
SODIUM SERPL-SCNC: 138 MMOL/L (ref 136–145)
TSH SERPL DL<=0.005 MIU/L-ACNC: 1.16 UIU/ML (ref 0.27–4.2)
VIT B12 SERPL-MCNC: 422 PG/ML (ref 211–911)

## 2024-08-21 ENCOUNTER — OFFICE VISIT (OUTPATIENT)
Dept: FAMILY MEDICINE CLINIC | Age: 24
End: 2024-08-21
Payer: COMMERCIAL

## 2024-08-21 VITALS
DIASTOLIC BLOOD PRESSURE: 60 MMHG | SYSTOLIC BLOOD PRESSURE: 122 MMHG | TEMPERATURE: 96.9 F | OXYGEN SATURATION: 98 % | HEIGHT: 63 IN | BODY MASS INDEX: 21.97 KG/M2 | HEART RATE: 92 BPM | WEIGHT: 124 LBS

## 2024-08-21 DIAGNOSIS — F33.1 MODERATE EPISODE OF RECURRENT MAJOR DEPRESSIVE DISORDER (HCC): ICD-10-CM

## 2024-08-21 DIAGNOSIS — F51.01 PRIMARY INSOMNIA: ICD-10-CM

## 2024-08-21 DIAGNOSIS — F41.9 ANXIETY: Primary | ICD-10-CM

## 2024-08-21 PROBLEM — F33.41 RECURRENT MAJOR DEPRESSIVE DISORDER, IN PARTIAL REMISSION (HCC): Status: RESOLVED | Noted: 2020-12-15 | Resolved: 2024-08-21

## 2024-08-21 PROBLEM — F41.1 GAD (GENERALIZED ANXIETY DISORDER): Status: RESOLVED | Noted: 2018-12-10 | Resolved: 2024-08-21

## 2024-08-21 PROCEDURE — 99214 OFFICE O/P EST MOD 30 MIN: CPT | Performed by: NURSE PRACTITIONER

## 2024-08-21 RX ORDER — FLUOXETINE 10 MG/1
10 CAPSULE ORAL DAILY
Qty: 30 CAPSULE | Refills: 3 | Status: SHIPPED | OUTPATIENT
Start: 2024-08-21

## 2024-08-21 RX ORDER — RIBOFLAVIN (VITAMIN B2) 100 MG
50 TABLET ORAL 2 TIMES DAILY
COMMUNITY
Start: 2023-08-31

## 2024-08-21 NOTE — ASSESSMENT & PLAN NOTE
Chronic-recurrent not well-controlled.  Patient intolerant to melatonin as this caused significant sleep disruption.  I have encouraged her to work on sleep hygiene, start going to bed at 8:00 at night with a goal to be in bed by 9 or 10 with a goal to be asleep by 11 PM.  She can then set a regular sleep-wake schedule at that time.  Discouraged use of electronics or bright lights prior to sleep time.  No caffeine or stimulants past 3 PM.  She is to follow-up with her PCP in 1 month.

## 2024-08-21 NOTE — ASSESSMENT & PLAN NOTE
Chronic recurrent not at goal.  Patient will restart Paxil as she had good relief with this in the past.  She will start at 10 mg a day may increase up to 20 mg daily if necessary.  I would like her to follow-up with her PCP in 1 month.  I did encourage her to get her sleep schedule under control as chronic fatigue from lack of sleep can contribute to anxiety and depression.

## 2024-08-21 NOTE — PROGRESS NOTES
Joan Odonnell (:  2000) is a 24 y.o. female,New patient, here for evaluation of the following chief complaint(s):  Sleep Problem and Anxiety         Assessment & Plan  Anxiety    Chronic recurrent not at goal.  Patient will restart Paxil as she had had good relief with this in the past.  She will start at 10 mg a day may increase to 20 if necessary.  I would like her to follow-up with her PCP in 1 month.         Moderate episode of recurrent major depressive disorder (HCC)    Chronic recurrent not at goal.  Patient will restart Paxil as she had good relief with this in the past.  She will start at 10 mg a day may increase up to 20 mg daily if necessary.  I would like her to follow-up with her PCP in 1 month.  I did encourage her to get her sleep schedule under control as chronic fatigue from lack of sleep can contribute to anxiety and depression.         Primary insomnia    Chronic-recurrent not well-controlled.  Patient intolerant to melatonin as this caused significant sleep disruption.  I have encouraged her to work on sleep hygiene, start going to bed at 8:00 at night with a goal to be in bed by 9 or 10 with a goal to be asleep by 11 PM.  She can then set a regular sleep-wake schedule at that time.  Discouraged use of electronics or bright lights prior to sleep time.  No caffeine or stimulants past 3 PM.  She is to follow-up with her PCP in 1 month.           No follow-ups on file.       Radha Llanos is a very pleasant 24-year-old female who has a longstanding history of anxiety and depression who presents today with concerns of her anxiety and recurrent insomnia.  She states in the past she been on Paxil 10 mg a day however she stopped taking this a number of years ago.  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  She is interested

## 2024-11-21 ENCOUNTER — TELEPHONE (OUTPATIENT)
Dept: INTERNAL MEDICINE CLINIC | Age: 24
End: 2024-11-21

## 2024-11-21 NOTE — TELEPHONE ENCOUNTER
Patient had a lab visit coded incorrectly.  Lab dept told her there was nothing they could do about it.    Billing dept advised the patient to call us.    Labs were done July 1, 2024.    Labs were drawn for a physical, yet it was coded as POTS.    Please call Maryam back to discuss.

## 2025-06-26 ENCOUNTER — OFFICE VISIT (OUTPATIENT)
Dept: INTERNAL MEDICINE CLINIC | Age: 25
End: 2025-06-26
Payer: COMMERCIAL

## 2025-06-26 VITALS
TEMPERATURE: 97.6 F | DIASTOLIC BLOOD PRESSURE: 66 MMHG | WEIGHT: 114 LBS | SYSTOLIC BLOOD PRESSURE: 100 MMHG | HEART RATE: 72 BPM | HEIGHT: 64 IN | BODY MASS INDEX: 19.46 KG/M2 | OXYGEN SATURATION: 99 %

## 2025-06-26 DIAGNOSIS — G43.009 MIGRAINE WITHOUT AURA AND WITHOUT STATUS MIGRAINOSUS, NOT INTRACTABLE: ICD-10-CM

## 2025-06-26 DIAGNOSIS — Z00.00 ENCOUNTER FOR WELL ADULT EXAM WITHOUT ABNORMAL FINDINGS: Primary | ICD-10-CM

## 2025-06-26 DIAGNOSIS — F33.1 MODERATE EPISODE OF RECURRENT MAJOR DEPRESSIVE DISORDER (HCC): ICD-10-CM

## 2025-06-26 DIAGNOSIS — E55.9 VITAMIN D DEFICIENCY: ICD-10-CM

## 2025-06-26 PROCEDURE — 99395 PREV VISIT EST AGE 18-39: CPT | Performed by: STUDENT IN AN ORGANIZED HEALTH CARE EDUCATION/TRAINING PROGRAM

## 2025-06-26 SDOH — ECONOMIC STABILITY: FOOD INSECURITY: WITHIN THE PAST 12 MONTHS, YOU WORRIED THAT YOUR FOOD WOULD RUN OUT BEFORE YOU GOT MONEY TO BUY MORE.: NEVER TRUE

## 2025-06-26 SDOH — ECONOMIC STABILITY: FOOD INSECURITY: WITHIN THE PAST 12 MONTHS, THE FOOD YOU BOUGHT JUST DIDN'T LAST AND YOU DIDN'T HAVE MONEY TO GET MORE.: NEVER TRUE

## 2025-06-26 ASSESSMENT — PATIENT HEALTH QUESTIONNAIRE - PHQ9
7. TROUBLE CONCENTRATING ON THINGS, SUCH AS READING THE NEWSPAPER OR WATCHING TELEVISION: NOT AT ALL
9. THOUGHTS THAT YOU WOULD BE BETTER OFF DEAD, OR OF HURTING YOURSELF: NOT AT ALL
1. LITTLE INTEREST OR PLEASURE IN DOING THINGS: SEVERAL DAYS
5. POOR APPETITE OR OVEREATING: SEVERAL DAYS
4. FEELING TIRED OR HAVING LITTLE ENERGY: SEVERAL DAYS
8. MOVING OR SPEAKING SO SLOWLY THAT OTHER PEOPLE COULD HAVE NOTICED. OR THE OPPOSITE, BEING SO FIGETY OR RESTLESS THAT YOU HAVE BEEN MOVING AROUND A LOT MORE THAN USUAL: NOT AT ALL
SUM OF ALL RESPONSES TO PHQ QUESTIONS 1-9: 5
SUM OF ALL RESPONSES TO PHQ QUESTIONS 1-9: 5
6. FEELING BAD ABOUT YOURSELF - OR THAT YOU ARE A FAILURE OR HAVE LET YOURSELF OR YOUR FAMILY DOWN: NOT AT ALL
2. FEELING DOWN, DEPRESSED OR HOPELESS: SEVERAL DAYS
SUM OF ALL RESPONSES TO PHQ QUESTIONS 1-9: 5
3. TROUBLE FALLING OR STAYING ASLEEP: SEVERAL DAYS
10. IF YOU CHECKED OFF ANY PROBLEMS, HOW DIFFICULT HAVE THESE PROBLEMS MADE IT FOR YOU TO DO YOUR WORK, TAKE CARE OF THINGS AT HOME, OR GET ALONG WITH OTHER PEOPLE: NOT DIFFICULT AT ALL
SUM OF ALL RESPONSES TO PHQ QUESTIONS 1-9: 5

## 2025-06-26 ASSESSMENT — ENCOUNTER SYMPTOMS
CHOKING: 0
APNEA: 0
WHEEZING: 0
ABDOMINAL PAIN: 0
CONSTIPATION: 0
VOICE CHANGE: 0
TROUBLE SWALLOWING: 0
COUGH: 0
SHORTNESS OF BREATH: 0
DIARRHEA: 0
STRIDOR: 0
PHOTOPHOBIA: 0
ABDOMINAL DISTENTION: 0
CHEST TIGHTNESS: 0
VOMITING: 0
NAUSEA: 0

## 2025-06-26 NOTE — ASSESSMENT & PLAN NOTE
Within normal limits for age- immunizations up to date, no depression ,no cognitive impairment  PAP smear and breast exam per GYN   No contributory family history requires early screening   Smokes occasionally - counsled pt  Eye exam up to date  Dental exam up to date   Exercises as tolerated  - walks   Findings and recommendations discussed with Pt

## 2025-06-26 NOTE — PROGRESS NOTES
Well Adult Note  Name: Joan Odonnell Today’s Date: 2025   MRN: [id number] Sex: Female   Age: 25 y.o. Ethnicity: Non- / Non    : 2000 Race: Chinese      Joan Odonnell is here for a well adult exam.       Assessment & Plan   Encounter for well adult exam without abnormal findings  Assessment & Plan:   Within normal limits for age- immunizations up to date, no depression ,no cognitive impairment  PAP smear and breast exam per GYN   No contributory family history requires early screening   Smokes occasionally - counsled pt  Eye exam up to date  Dental exam up to date   Exercises as tolerated  - walks   Findings and recommendations discussed with Pt    Moderate episode of recurrent major depressive disorder (HCC)  Assessment & Plan:   She reports overall stable   She off from Prozac on her own about 10 months ago  Migraine without aura and without status migrainosus, not intractable  Assessment & Plan:  See neurology.  She is on Imitrex as needed and naproxen as needed  Vitamin D deficiency  Assessment & Plan:  Check vitamin D level.  No longer on vitamin D supplement        Return in about 1 year (around 2026) for Annual Physical Exam.       Subjective   History:  She is doing well   Used to work in [employer] for 8 mons    Review of Systems   Constitutional:  Negative for activity change, appetite change, chills, diaphoresis, fatigue, fever and unexpected weight change.   HENT:  Negative for trouble swallowing and voice change.    Eyes:  Negative for photophobia and visual disturbance.   Respiratory:  Negative for apnea, cough, choking, chest tightness, shortness of breath, wheezing and stridor.    Cardiovascular:  Negative for chest pain, palpitations and leg swelling.   Gastrointestinal:  Negative for abdominal distention, abdominal pain, constipation, diarrhea, nausea and vomiting.   Genitourinary:  Negative for difficulty urinating and dysuria.   Skin:  Negative for rash and wound.